# Patient Record
Sex: MALE | Race: BLACK OR AFRICAN AMERICAN
[De-identification: names, ages, dates, MRNs, and addresses within clinical notes are randomized per-mention and may not be internally consistent; named-entity substitution may affect disease eponyms.]

---

## 2017-05-12 ENCOUNTER — HOSPITAL ENCOUNTER (EMERGENCY)
Dept: HOSPITAL 53 - M ED | Age: 32
Discharge: HOME | End: 2017-05-12
Payer: OTHER GOVERNMENT

## 2017-05-12 VITALS — BODY MASS INDEX: 24.25 KG/M2 | HEIGHT: 68 IN | WEIGHT: 160 LBS

## 2017-05-12 VITALS — DIASTOLIC BLOOD PRESSURE: 81 MMHG | SYSTOLIC BLOOD PRESSURE: 140 MMHG

## 2017-05-12 DIAGNOSIS — K58.0: Primary | ICD-10-CM

## 2017-05-12 LAB
ALBUMIN SERPL BCG-MCNC: 4 GM/DL (ref 3.2–5.2)
ALBUMIN/GLOB SERPL: 0.93 {RATIO} (ref 1–1.93)
ALP SERPL-CCNC: 78 U/L (ref 45–117)
ALT SERPL W P-5'-P-CCNC: 36 U/L (ref 12–78)
AMYLASE SERPL-CCNC: 94 U/L (ref 25–115)
ANION GAP SERPL CALC-SCNC: 5 MEQ/L (ref 8–16)
AST SERPL-CCNC: 29 U/L (ref 15–37)
BASOPHILS # BLD AUTO: 0 K/MM3 (ref 0–0.2)
BASOPHILS NFR BLD AUTO: 0.4 % (ref 0–1)
BILIRUB CONJ SERPL-MCNC: < 0.1 MG/DL (ref 0–0.2)
BILIRUB SERPL-MCNC: 0.4 MG/DL (ref 0.2–1)
BUN SERPL-MCNC: 16 MG/DL (ref 7–18)
CALCIUM SERPL-MCNC: 9.2 MG/DL (ref 8.5–10.1)
CHLORIDE SERPL-SCNC: 104 MEQ/L (ref 98–107)
CO2 SERPL-SCNC: 30 MEQ/L (ref 21–32)
CREAT SERPL-MCNC: 1.1 MG/DL (ref 0.7–1.3)
EOSINOPHIL # BLD AUTO: 0.1 K/MM3 (ref 0–0.5)
EOSINOPHIL NFR BLD AUTO: 2 % (ref 0–3)
ERYTHROCYTE [DISTWIDTH] IN BLOOD BY AUTOMATED COUNT: 13 % (ref 11.5–14.5)
ERYTHROCYTE [SEDIMENTATION RATE] IN BLOOD BY WESTERGREN METHOD: 6 MM/HR (ref 0–15)
GFR SERPL CREATININE-BSD FRML MDRD: > 60 ML/MIN/{1.73_M2} (ref 60–?)
GLUCOSE SERPL-MCNC: 105 MG/DL (ref 70–105)
LARGE UNSTAINED CELL #: 0.1 K/MM3 (ref 0–0.4)
LARGE UNSTAINED CELL %: 1.4 % (ref 0–4)
LYMPHOCYTES # BLD AUTO: 1.6 K/MM3 (ref 1.5–4.5)
LYMPHOCYTES NFR BLD AUTO: 33.9 % (ref 24–44)
MCH RBC QN AUTO: 30.5 PG (ref 27–33)
MCHC RBC AUTO-ENTMCNC: 33.4 G/DL (ref 32–36.5)
MCV RBC AUTO: 91.1 FL (ref 80–96)
MONOCYTES # BLD AUTO: 0.2 K/MM3 (ref 0–0.8)
MONOCYTES NFR BLD AUTO: 4.7 % (ref 0–5)
NEUTROPHILS # BLD AUTO: 2.7 K/MM3 (ref 1.8–7.7)
NEUTROPHILS NFR BLD AUTO: 57.6 % (ref 36–66)
PLATELET # BLD AUTO: 245 K/MM3 (ref 150–450)
POTASSIUM SERPL-SCNC: 3.8 MEQ/L (ref 3.5–5.1)
PROT SERPL-MCNC: 8.3 GM/DL (ref 6.4–8.2)
SODIUM SERPL-SCNC: 139 MEQ/L (ref 136–145)
WBC # BLD AUTO: 4.6 K/MM3 (ref 4–10)

## 2017-06-02 ENCOUNTER — HOSPITAL ENCOUNTER (EMERGENCY)
Dept: HOSPITAL 53 - M ED | Age: 32
Discharge: HOME | End: 2017-06-02
Payer: OTHER GOVERNMENT

## 2017-06-02 VITALS — WEIGHT: 160 LBS | BODY MASS INDEX: 24.25 KG/M2 | HEIGHT: 68 IN

## 2017-06-02 VITALS — DIASTOLIC BLOOD PRESSURE: 83 MMHG | SYSTOLIC BLOOD PRESSURE: 133 MMHG

## 2017-06-02 DIAGNOSIS — S90.821A: Primary | ICD-10-CM
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
## 2017-06-02 PROCEDURE — 99282 EMERGENCY DEPT VISIT SF MDM: CPT

## 2017-06-02 PROCEDURE — 96372 THER/PROPH/DIAG INJ SC/IM: CPT

## 2017-08-12 ENCOUNTER — HOSPITAL ENCOUNTER (EMERGENCY)
Dept: HOSPITAL 53 - M ED | Age: 32
Discharge: HOME | End: 2017-08-12
Payer: OTHER GOVERNMENT

## 2017-08-12 VITALS — SYSTOLIC BLOOD PRESSURE: 153 MMHG | DIASTOLIC BLOOD PRESSURE: 98 MMHG

## 2017-08-12 VITALS — BODY MASS INDEX: 24.29 KG/M2 | HEIGHT: 68 IN | WEIGHT: 160.28 LBS

## 2017-08-12 DIAGNOSIS — A04.0: Primary | ICD-10-CM

## 2017-08-12 LAB
ALBUMIN SERPL BCG-MCNC: 3.9 GM/DL (ref 3.2–5.2)
ALBUMIN/GLOB SERPL: 0.95 {RATIO} (ref 1–1.93)
ALP SERPL-CCNC: 74 U/L (ref 45–117)
ALT SERPL W P-5'-P-CCNC: 46 U/L (ref 12–78)
AMYLASE SERPL-CCNC: 93 U/L (ref 25–115)
ANION GAP SERPL CALC-SCNC: 6 MEQ/L (ref 8–16)
AST SERPL-CCNC: 48 U/L (ref 15–37)
BASOPHILS # BLD AUTO: 0 K/MM3 (ref 0–0.2)
BASOPHILS NFR BLD AUTO: 1 % (ref 0–1)
BILIRUB CONJ SERPL-MCNC: 0.1 MG/DL (ref 0–0.2)
BILIRUB SERPL-MCNC: 0.6 MG/DL (ref 0.2–1)
BUN SERPL-MCNC: 10 MG/DL (ref 7–18)
CALCIUM SERPL-MCNC: 8.5 MG/DL (ref 8.5–10.1)
CHLORIDE SERPL-SCNC: 107 MEQ/L (ref 98–107)
CO2 SERPL-SCNC: 28 MEQ/L (ref 21–32)
CREAT SERPL-MCNC: 1.18 MG/DL (ref 0.7–1.3)
EOSINOPHIL # BLD AUTO: 0.1 K/MM3 (ref 0–0.5)
EOSINOPHIL NFR BLD AUTO: 2.6 % (ref 0–3)
ERYTHROCYTE [DISTWIDTH] IN BLOOD BY AUTOMATED COUNT: 13.4 % (ref 11.5–14.5)
GFR SERPL CREATININE-BSD FRML MDRD: > 60 ML/MIN/{1.73_M2} (ref 60–?)
GLUCOSE SERPL-MCNC: 108 MG/DL (ref 70–105)
LARGE UNSTAINED CELL #: 0.1 K/MM3 (ref 0–0.4)
LARGE UNSTAINED CELL %: 1.2 % (ref 0–4)
LYMPHOCYTES # BLD AUTO: 1.6 K/MM3 (ref 1.5–4.5)
LYMPHOCYTES NFR BLD AUTO: 41.9 % (ref 24–44)
MCH RBC QN AUTO: 30.4 PG (ref 27–33)
MCHC RBC AUTO-ENTMCNC: 33.2 G/DL (ref 32–36.5)
MCV RBC AUTO: 91.6 FL (ref 80–96)
MONOCYTES # BLD AUTO: 0.2 K/MM3 (ref 0–0.8)
MONOCYTES NFR BLD AUTO: 5.8 % (ref 0–5)
NEUTROPHILS # BLD AUTO: 1.8 K/MM3 (ref 1.8–7.7)
NEUTROPHILS NFR BLD AUTO: 47.5 % (ref 36–66)
PLATELET # BLD AUTO: 210 K/MM3 (ref 150–450)
POTASSIUM SERPL-SCNC: 3.9 MEQ/L (ref 3.5–5.1)
PROT SERPL-MCNC: 8 GM/DL (ref 6.4–8.2)
SODIUM SERPL-SCNC: 141 MEQ/L (ref 136–145)
WBC # BLD AUTO: 3.8 K/MM3 (ref 4–10)

## 2017-09-15 ENCOUNTER — HOSPITAL ENCOUNTER (OUTPATIENT)
Dept: HOSPITAL 53 - M RAD | Age: 32
End: 2017-09-15
Attending: INTERNAL MEDICINE
Payer: OTHER GOVERNMENT

## 2017-09-15 DIAGNOSIS — E73.9: Primary | ICD-10-CM

## 2017-09-15 PROCEDURE — 74178 CT ABD&PLV WO CNTR FLWD CNTR: CPT

## 2017-09-15 NOTE — REP
CT of the abdomen pelvis without and with IV contrast.  Study is performed with

bowel contrast on both phases of the study.

 

Initially scan is performed through the liver without IV contrast.  This is

followed by IV contrast and scanning from the diaphragms to the pubic symphysis.

 

There are no comparison studies.

 

The visualized lung fields are unremarkable.

 

The hepatic parenchyma, gallbladder, pancreas and spleen are unremarkable on both

phases of the study.

 

The adrenals, kidneys and abdominal aorta are unremarkable.

 

There is no small bowel distension or wall thickening.  The mesentery is

unremarkable.

 

Pelvis:

 

There is no adenopathy or ascites.  The pelvic bowel loops are unremarkable.  The

bladder is unremarkable.

 

Impression:

 

Essentially negative CT of the abdomen and pelvis.

 

 

Signed by

Dimas Zhang MD 09/15/2017 02:12 P

## 2017-10-03 ENCOUNTER — HOSPITAL ENCOUNTER (OUTPATIENT)
Dept: HOSPITAL 53 - M OPP | Age: 32
Discharge: HOME | End: 2017-10-03
Attending: INTERNAL MEDICINE
Payer: OTHER GOVERNMENT

## 2017-10-03 VITALS — SYSTOLIC BLOOD PRESSURE: 113 MMHG | DIASTOLIC BLOOD PRESSURE: 69 MMHG

## 2017-10-03 VITALS — HEIGHT: 68 IN | WEIGHT: 160 LBS | BODY MASS INDEX: 24.25 KG/M2
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
## 2017-10-03 DIAGNOSIS — M54.30: ICD-10-CM

## 2017-10-03 DIAGNOSIS — R51: ICD-10-CM

## 2017-10-03 DIAGNOSIS — K64.8: ICD-10-CM

## 2017-10-03 DIAGNOSIS — R42: ICD-10-CM
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
## 2017-10-03 DIAGNOSIS — N50.819: ICD-10-CM

## 2017-10-03 DIAGNOSIS — Z79.899: ICD-10-CM

## 2017-10-03 DIAGNOSIS — R12: ICD-10-CM

## 2017-10-03 DIAGNOSIS — T75.3XXD: ICD-10-CM

## 2017-10-03 DIAGNOSIS — R19.7: Primary | ICD-10-CM

## 2017-10-03 DIAGNOSIS — K29.70: ICD-10-CM

## 2017-10-03 DIAGNOSIS — R10.9: ICD-10-CM

## 2017-10-03 NOTE — ROOR
________________________________________________________________________________

Patient Name: Дмитрий Villanueva              Procedure Date: 10/3/2017 2:46 PM

MRN: [id number]                          Account Number: [id number]

YOB: 1985              Age: 31

Room: [facility]                            Gender: Male

Note Status: Finalized                 

________________________________________________________________________________

 

Procedure:           Upper GI endoscopy

Indications:         Abdominal pain

Providers:           Delta DE LA GARZA MD

Referring MD:        EPIFNAIO GODFREY MD

Requesting Provider: 

Medicines:           Monitored Anesthesia Care

Complications:       No immediate complications.

________________________________________________________________________________

Procedure:           Pre-Anesthesia Assessment:

                     - The heart rate, respiratory rate, oxygen saturations, 

                     blood pressure, adequacy of pulmonary ventilation, and 

                     response to care were monitored throughout the procedure.

                     The Endoscope was introduced through the mouth, and 

                     advanced to the second part of duodenum. The upper GI 

                     endoscopy was accomplished without difficulty. The 

                     patient tolerated the procedure well.

                                                                                

Findings:

     Diffuse mild inflammation characterized by erythema was found in the 

     gastric antrum. Biopsies were taken with a cold forceps for Helicobacter 

     pylori testing.

     The examined esophagus was normal.

     The examined duodenum was normal.

                                                                                

Impression:          - Mild Gastritis. Biopsied.

                     - Normal esophagus.

                     - Normal examined duodenum.

Recommendation:      - Continue present medications.

                     - Telephone endoscopist for pathology results in 2 weeks.

                                                                                

 

Delta De La Garza MD

________________

Delta DE LA GARZA MD

10/3/2017 3:03:36 PM

This report has been signed electronically.

Number of Addenda: 0

 

Note Initiated On: 10/3/2017 2:46 PM

Estimated Blood Loss:

     Estimated blood loss: none.

## 2017-10-03 NOTE — ROOR
________________________________________________________________________________

Patient Name: Дмитрий Villanueva              Procedure Date: 10/3/2017 2:47 PM

MRN: [id number]                          Account Number: [id number]

YOB: 1985              Age: 31

Room: [facility]                            Gender: Male

Note Status: Finalized                 

________________________________________________________________________________

 

Procedure:           Colonoscopy

Indications:         Clinically significant diarrhea of unexplained origin, 

                     Suspected irritable bowel syndrome, Irritable bowel 

                     syndrome with diarrhea

Providers:           Delta DE LA GARZA MD

Referring MD:        EPIAFNIO GODFREY MD

Requesting Provider: 

Medicines:           Monitored Anesthesia Care

Complications:       No immediate complications.

________________________________________________________________________________

Procedure:           Pre-Anesthesia Assessment:

                     - The heart rate, respiratory rate, oxygen saturations, 

                     blood pressure, adequacy of pulmonary ventilation, and 

                     response to care were monitored throughout the procedure.

                     The Colonoscope was introduced through the anus and 

                     advanced to 8 cm into the ileum. The colonoscopy was 

                     performed without difficulty. The patient tolerated the 

                     procedure well. The quality of the bowel preparation was 

                     good.

                                                                                

Findings:

     The perianal and digital rectal examinations were normal.

     Small Internal Hemorrhoids.

     The entire examined colon appeared normal on direct and retroflexion 

     views.

     The terminal ileum appeared normal.

                                                                                

Impression:          - Small Internal Hemorrhoids.

                     - The entire colon is normal on direct and retroflexion 

                     views.

                     - The examined portion of the ileum was normal.

                     - No specimens collected.

                     - (Irritable Bowel Syndrome/IBS suspected.)

Recommendation:      - Continue present medications.

                                                                                

 

Delta De La Garza MD

________________

Delta DE LA GARZA MD

10/3/2017 3:13:07 PM

This report has been signed electronically.

Number of Addenda: 0

 

Note Initiated On: 10/3/2017 2:47 PM

Estimated Blood Loss:

     Estimated blood loss: none.

## 2017-10-05 ENCOUNTER — HOSPITAL ENCOUNTER (OUTPATIENT)
Dept: HOSPITAL 53 - M LAB | Age: 32
End: 2017-10-05
Attending: INTERNAL MEDICINE
Payer: OTHER GOVERNMENT

## 2017-10-05 DIAGNOSIS — E73.9: Primary | ICD-10-CM

## 2017-10-05 LAB
IGA SERPL-MCNC: 147 MG/DL (ref 70–400)
T4 FREE SERPL-MCNC: 0.97 NG/DL (ref 0.76–1.46)

## 2018-05-02 ENCOUNTER — HOSPITAL ENCOUNTER (EMERGENCY)
Dept: HOSPITAL 53 - M ED | Age: 33
Discharge: HOME | End: 2018-05-02
Payer: COMMERCIAL

## 2018-05-02 DIAGNOSIS — N41.0: Primary | ICD-10-CM

## 2018-05-02 DIAGNOSIS — R51: ICD-10-CM

## 2018-05-02 LAB
CHLAMYDIA DNA AMPLIFICATION: NEGATIVE
GC DNA AMPLIFICATION: NEGATIVE
LEUKOCYTE ESTERASE UR AUTO RFX: NEGATIVE
SPECIFIC GRAVITY UR AUTO RFX: 1.02 (ref 1–1.03)
SQUAM EPITHELIAL CELL UR AURFX: 0 /HPF (ref 0–6)

## 2018-05-02 PROCEDURE — 81001 URINALYSIS AUTO W/SCOPE: CPT

## 2018-05-04 LAB — GLUCOSE BLDC GLUCOMTR-MCNC: 96 MG/DL (ref 70–105)
